# Patient Record
Sex: FEMALE | Race: ASIAN | URBAN - METROPOLITAN AREA
[De-identification: names, ages, dates, MRNs, and addresses within clinical notes are randomized per-mention and may not be internally consistent; named-entity substitution may affect disease eponyms.]

---

## 2018-10-29 ENCOUNTER — OUTPATIENT (OUTPATIENT)
Dept: OUTPATIENT SERVICES | Facility: HOSPITAL | Age: 53
LOS: 1 days | End: 2018-10-29
Payer: SELF-PAY

## 2018-10-29 ENCOUNTER — APPOINTMENT (OUTPATIENT)
Dept: SURGERY | Facility: CLINIC | Age: 53
End: 2018-10-29
Payer: SELF-PAY

## 2018-10-29 ENCOUNTER — APPOINTMENT (OUTPATIENT)
Dept: MAMMOGRAPHY | Facility: IMAGING CENTER | Age: 53
End: 2018-10-29
Payer: SUBSIDIZED

## 2018-10-29 DIAGNOSIS — Z00.8 ENCOUNTER FOR OTHER GENERAL EXAMINATION: ICD-10-CM

## 2018-10-29 PROBLEM — Z00.00 ENCOUNTER FOR PREVENTIVE HEALTH EXAMINATION: Status: ACTIVE | Noted: 2018-10-29

## 2018-10-29 PROCEDURE — 77066 DX MAMMO INCL CAD BI: CPT

## 2018-10-29 PROCEDURE — G0279: CPT | Mod: 26

## 2018-10-29 PROCEDURE — 99205K: CUSTOM

## 2018-10-29 PROCEDURE — 76641 ULTRASOUND BREAST COMPLETE: CPT

## 2018-10-29 PROCEDURE — 76641 ULTRASOUND BREAST COMPLETE: CPT | Mod: 26,50

## 2018-10-29 PROCEDURE — 77066 DX MAMMO INCL CAD BI: CPT | Mod: 26

## 2018-10-29 PROCEDURE — G0279: CPT

## 2019-04-10 ENCOUNTER — TRANSCRIPTION ENCOUNTER (OUTPATIENT)
Age: 54
End: 2019-04-10

## 2019-06-25 ENCOUNTER — APPOINTMENT (OUTPATIENT)
Dept: ORTHOPEDIC SURGERY | Facility: CLINIC | Age: 54
End: 2019-06-25
Payer: SELF-PAY

## 2019-06-25 ENCOUNTER — TRANSCRIPTION ENCOUNTER (OUTPATIENT)
Age: 54
End: 2019-06-25

## 2019-06-25 VITALS — HEIGHT: 60 IN | BODY MASS INDEX: 19.63 KG/M2 | WEIGHT: 100 LBS

## 2019-06-25 VITALS
BODY MASS INDEX: 19.63 KG/M2 | HEART RATE: 59 BPM | DIASTOLIC BLOOD PRESSURE: 71 MMHG | SYSTOLIC BLOOD PRESSURE: 110 MMHG | HEIGHT: 60 IN | WEIGHT: 100 LBS

## 2019-06-25 DIAGNOSIS — Z87.891 PERSONAL HISTORY OF NICOTINE DEPENDENCE: ICD-10-CM

## 2019-06-25 DIAGNOSIS — M54.9 DORSALGIA, UNSPECIFIED: ICD-10-CM

## 2019-06-25 DIAGNOSIS — M54.2 CERVICALGIA: ICD-10-CM

## 2019-06-25 DIAGNOSIS — Z78.9 OTHER SPECIFIED HEALTH STATUS: ICD-10-CM

## 2019-06-25 PROCEDURE — 72070 X-RAY EXAM THORAC SPINE 2VWS: CPT

## 2019-06-25 PROCEDURE — 72040 X-RAY EXAM NECK SPINE 2-3 VW: CPT

## 2019-06-25 PROCEDURE — 99204 OFFICE O/P NEW MOD 45 MIN: CPT

## 2019-06-25 RX ORDER — IBUPROFEN 600 MG/1
600 TABLET, FILM COATED ORAL
Qty: 90 | Refills: 0 | Status: ACTIVE | COMMUNITY
Start: 2019-06-25 | End: 1900-01-01

## 2019-06-25 NOTE — PHYSICAL EXAM
[de-identified] : She is fully alert and oriented with a normal mood and affect. She is in no acute distress. She ambulates with a normal gait including tiptoe and heel walking. There is no evidence of unsteadiness or spasticity. There are no cutaneous abnormalities or palpable bony defects of the spine. There is no evidence of shortness of breath or respiratory distress. There is no paravertebral muscle spasm, sciatic notch tenderness or trochanteric tenderness. There is some mild tenderness just below the cervicothoracic junction. There is no ecchymosis in the area. Her lower extremity neurological examination revealed absent reflexes with normal motor power and sensation. Toes are downgoing. Straight leg raising is negative to 90°. Her knees have a full range of motion with normal stability. Vascular exam shows no evidence of varicosities there is no lymphedema. There are no cutaneous abnormalities of the upper or lower extremities. Shoulder range of motion is full, painless and symmetrical. Her upper extremity neurological examination revealed 1+ symmetrical reflexes with normal motor power and sensation. Range of motion of the cervical spine shoulder flexion with the chin region to within 2 fingerbreadths of the chest causing discomfort. Extension is to 80° with rotation of 80° bilaterally internal to 30° bilaterally. [de-identified] : AP and lateral x-rays of the cervical and thoracic spine are obtained. There is mild loss of the normal cervical lordosis and some mild disc space narrowing at one level. There is no evidence of fracture and there are no destructive changes. X-rays of the thoracic spine revealed only a mild scoliosis and age-appropriate degenerative changes. There are no destructive changes.

## 2019-06-25 NOTE — HISTORY OF PRESENT ILLNESS
[Pain Location] : pain [de-identified] : This 53-year-old woman is 4 days of upper back pain after a fall coming out of the shower. She has a history of neck pain over the last year without radiation to the arms. She has not had associated neurologic symptoms. The current symptoms are worse with coughing and sneezing she has had night pain. There have been no changes in her gait or balance. She has been using a pain patch from [place]. [6] : a maximum pain level of 6/10

## 2019-06-25 NOTE — DISCUSSION/SUMMARY
[Medication Risks Reviewed] : Medication risks reviewed [de-identified] : I recommended moist heat and she has been started on ibuprofen 600 mg 3 times a day. She will call if there are problems with the medication or worsening of her symptoms and I will see her for followup in 3 weeks per